# Patient Record
Sex: MALE | Race: WHITE
[De-identification: names, ages, dates, MRNs, and addresses within clinical notes are randomized per-mention and may not be internally consistent; named-entity substitution may affect disease eponyms.]

---

## 2018-01-08 ENCOUNTER — HOSPITAL ENCOUNTER (EMERGENCY)
Dept: HOSPITAL 76 - ED | Age: 23
Discharge: HOME | End: 2018-01-08
Payer: COMMERCIAL

## 2018-01-08 VITALS — SYSTOLIC BLOOD PRESSURE: 134 MMHG | DIASTOLIC BLOOD PRESSURE: 75 MMHG

## 2018-01-08 DIAGNOSIS — S90.512A: ICD-10-CM
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
## 2018-01-08 DIAGNOSIS — S90.02XA: Primary | ICD-10-CM

## 2018-01-08 PROCEDURE — 99283 EMERGENCY DEPT VISIT LOW MDM: CPT

## 2018-01-08 PROCEDURE — 99282 EMERGENCY DEPT VISIT SF MDM: CPT

## 2018-01-08 NOTE — XRAY REPORT
EXAM:

LEFT ANKLE RADIOGRAPHY

 

EXAM DATE: 1/8/2018 08:17 PM.

 

CLINICAL HISTORY: Dropped a weight on his ankle.

 

COMPARISON: None.

 

TECHNIQUE: 3 views.

 

FINDINGS: 

Bones: Normal. No fractures or bone lesions.

 

Joints: Normal. No effusion. No subluxations. The ankle mortise is normally aligned.

 

Soft Tissues: Normal. No soft tissue swelling.

 

IMPRESSION: Normal ankle radiography.

 

[facility]

Referring Provider Line: [phone]

 

SITE ID: [id number]

## 2018-01-08 NOTE — ED PHYSICIAN DOCUMENTATION
PD HPI LOWER EXT INJURY





- Stated complaint


Stated Complaint: L ANKLE INJ





- Chief complaint


Chief Complaint: Ext Problem





- History obtained from


History obtained from: Patient





- History of Present Illness


PD HPI LOW EXT INJURY LOCATION: Left, Ankle


Type of injury: Crush


Where injury occurred: Other


Timing - onset: Today


Timing - details: Abrupt onset


Worsened by: Moving, Palpating


Associated symptoms: Discolored


Similar symptoms before: Has not had sx before


Recently seen: Not recently seen





- Additional information


Additional information: 





Patient is a 22 year old male with no significant past medical history who is 

presenting to the emergency department for left ankle pain.  patient states 

that he dropped a weight on his ankle earlier tonight and now he is having 

difficulty with ambulation.  Patient is up to date with his tetanus.  





Review of Systems


Constitutional: reports: Reviewed and negative


Eyes: reports: Reviewed and negative


Ears: reports: Reviewed and negative


Nose: reports: Reviewed and negative


Throat: reports: Reviewed and negative


Cardiac: reports: Reviewed and negative


GI: reports: Reviewed and negative


: reports: Reviewed and negative


Skin: reports: Abrasion (s)


Musculoskeletal: reports: Extremity pain, Joint pain, Extremity swelling


Neurologic: denies: Generalized weakness, Focal weakness, Numbness


Immunocompromised: denies: Immunocompromised





PD PAST MEDICAL HISTORY





- Past Medical History


Past Medical History: No





- Past Surgical History


Past Surgical History: No





- Present Medications


Home Medications: 


 Ambulatory Orders











 Medication  Instructions  Recorded  Confirmed


 


No Known Home Medications [No  01/08/18 01/08/18





Known Home Medications]   














- Allergies


Allergies/Adverse Reactions: 


 Allergies











Allergy/AdvReac Type Severity Reaction Status Date / Time


 


No Known Drug Allergies Allergy   Verified 01/08/18 20:00














- Social History


Does the pt smoke?: No


Smoking Status: Never smoker


Does the pt drink ETOH?: Yes


Does the pt have substance abuse?: No





- Immunizations


Immunizations are current?: Yes





PD ED PE NORMAL





- Vitals


Vital signs reviewed: Yes





- General


General: Alert and oriented X 3





- HEENT


HEENT: Atraumatic, PERRL





- Neck


Neck: Supple, no meningeal sign





- Cardiac


Cardiac: RRR





- Respiratory


Respiratory: No respiratory distress





- Abdomen


Abdomen: Non distended





- Neuro


Neuro: Alert and oriented X 3, No motor deficit, No sensory deficit, Normal 

speech





- Psych


Psych: Normal mood





PD ED PE EXPANDED





- Derm


Derm: Abrasion (s)





- Extremities


Extremities: Left ankle (abrasion to distal anterior tib/fib, tenderness to 

palpation of ankle), Motor intact, Sensory intact, Vascular intact, Tendon 

intact





Results





- Vitals


Vitals: 


 Vital Signs - 24 hr











  01/08/18





  19:57


 


Temperature 36.4 C L


 


Heart Rate 86


 


Respiratory 16





Rate 


 


Blood Pressure 134/75 H


 


O2 Saturation 98








 Oxygen











O2 Source                      Room air

















- Rads (name of study)


  ** ankle x-ray


Radiology: Final report received (no acute fracture or dislocation)





PD MEDICAL DECISION MAKING





- ED course


Complexity details: reviewed old records, reviewed results, re-evaluated patient

, considered differential, d/w patient


ED course: 





Patient was seen and examined at bedside.  patient was sent for imaging.  When 

patient returned the results were reviewed.  there was no acute fracture or 

dislocation.  patient required no further work up and was stable for discharge 

with outpatient follow up.  





Departure





- Departure


Disposition: 01 Home, Self Care


Clinical Impression: 


 Contusion of ankle, left





Condition: Good


Instructions:  ED Contusion Foot


Follow-Up: 


primary,care provider [Other] - As Needed


Comments: 


Your diagnostics today are within normal limits.  there is no acute fracture or 

dislocation.  You can take tylenol or motrin as needed for pain.  You should 

ice your ankle.  You can return to normal activity.  


Forms:  Activity restrictions

## 2022-12-14 ENCOUNTER — OFFICE VISIT (OUTPATIENT)
Dept: URBAN - METROPOLITAN AREA CLINIC 5 | Facility: CLINIC | Age: 27
End: 2022-12-14
Payer: COMMERCIAL

## 2022-12-14 DIAGNOSIS — H52.13 MYOPIA, BILATERAL: Primary | ICD-10-CM

## 2022-12-14 DIAGNOSIS — H31.091 CHORIORETINAL SCARS, RIGHT EYE: ICD-10-CM

## 2022-12-14 DIAGNOSIS — H21.9 DISORDER OF IRIS: ICD-10-CM

## 2022-12-14 PROCEDURE — 92004 COMPRE OPH EXAM NEW PT 1/>: CPT | Performed by: OPTOMETRIST

## 2022-12-14 PROCEDURE — 92250 FUNDUS PHOTOGRAPHY W/I&R: CPT | Performed by: OPTOMETRIST

## 2022-12-14 ASSESSMENT — VISUAL ACUITY
OD: 20/25
OS: 20/20

## 2022-12-14 ASSESSMENT — INTRAOCULAR PRESSURE
OD: 14
OS: 8

## 2022-12-14 NOTE — IMPRESSION/PLAN
Impression: Myopia, bilateral: H52.13. Plan: SRx released to patient for FTW. Monitor for changes in refractive error on an annual basis or sooner prn. Pt would like to pursue LASIK consult, will schedule with Dr. Danika Jacome.

## 2022-12-14 NOTE — IMPRESSION/PLAN
Impression: Chorioretinal scars, right eye: H31.091. 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 No H/T/RD. Ordered fundus photos today for baseline. Monitor.